# Patient Record
Sex: MALE | Race: BLACK OR AFRICAN AMERICAN | NOT HISPANIC OR LATINO | URBAN - METROPOLITAN AREA
[De-identification: names, ages, dates, MRNs, and addresses within clinical notes are randomized per-mention and may not be internally consistent; named-entity substitution may affect disease eponyms.]

---

## 2021-10-16 ENCOUNTER — EMERGENCY (EMERGENCY)
Facility: HOSPITAL | Age: 26
LOS: 1 days | Discharge: HOME | End: 2021-10-16
Attending: EMERGENCY MEDICINE | Admitting: EMERGENCY MEDICINE
Payer: MEDICAID

## 2021-10-16 VITALS
HEART RATE: 78 BPM | DIASTOLIC BLOOD PRESSURE: 58 MMHG | OXYGEN SATURATION: 96 % | TEMPERATURE: 98 F | SYSTOLIC BLOOD PRESSURE: 116 MMHG | RESPIRATION RATE: 12 BRPM

## 2021-10-16 PROCEDURE — 99283 EMERGENCY DEPT VISIT LOW MDM: CPT | Mod: 25

## 2021-10-16 PROCEDURE — 12001 RPR S/N/AX/GEN/TRNK 2.5CM/<: CPT

## 2021-10-16 RX ORDER — TETANUS TOXOID, REDUCED DIPHTHERIA TOXOID AND ACELLULAR PERTUSSIS VACCINE, ADSORBED 5; 2.5; 8; 8; 2.5 [IU]/.5ML; [IU]/.5ML; UG/.5ML; UG/.5ML; UG/.5ML
0.5 SUSPENSION INTRAMUSCULAR ONCE
Refills: 0 | Status: COMPLETED | OUTPATIENT
Start: 2021-10-16 | End: 2021-10-16

## 2021-10-16 RX ORDER — IBUPROFEN 200 MG
400 TABLET ORAL ONCE
Refills: 0 | Status: COMPLETED | OUTPATIENT
Start: 2021-10-16 | End: 2021-10-16

## 2021-10-16 RX ORDER — ACETAMINOPHEN 500 MG
650 TABLET ORAL ONCE
Refills: 0 | Status: COMPLETED | OUTPATIENT
Start: 2021-10-16 | End: 2021-10-16

## 2021-10-16 RX ADMIN — Medication 650 MILLIGRAM(S): at 14:48

## 2021-10-16 RX ADMIN — Medication 400 MILLIGRAM(S): at 14:43

## 2021-10-16 RX ADMIN — TETANUS TOXOID, REDUCED DIPHTHERIA TOXOID AND ACELLULAR PERTUSSIS VACCINE, ADSORBED 0.5 MILLILITER(S): 5; 2.5; 8; 8; 2.5 SUSPENSION INTRAMUSCULAR at 14:43

## 2021-10-16 NOTE — ED PROVIDER NOTE - NSFOLLOWUPINSTRUCTIONS_ED_ALL_ED_FT
Finger Amputation    WHAT YOU NEED TO KNOW:    Finger amputation is when part of a finger is removed from your hand.    DISCHARGE INSTRUCTIONS:    Follow up with your bone or hand specialist within 2 days: Write down any questions you have so you remember to ask them in your follow-up visits.    How to care for your injury: Follow your treatment plan to help prevent an infection or further tissue loss.    Wound care: If you do not see a bone or hand specialist within 2 days, you will need to change your bandage and clean your wound as directed.    Splints: You may need to wear a splint to support your finger while it heals. Wear the splint as directed.  Medicines:    Pain medicine: You may be given pain medicine to take away or decrease pain. Do not wait until the pain is severe before you take your medicine.    Antibiotics: This medicine will help fight or prevent an infection. Take your antibiotics until they are gone, even if you feel better.    Take your medicine as directed: Call your bone or hand specialist if you think your medicine is not helping or if you have side effects. Tell him if you are taking any vitamins, herbs, or other medicines. Keep a list of the medicines you take. Include the medicine given to you today. Bring the list or the pill bottles to follow-up visits.  Return to the emergency department if:    Your wound drains pus (thick white or yellow fluid).    Your wound starts to bleed and does not stop even after you apply pressure.    Your wound bleeds more than usual.    Your pain is severe even after you take your pain medicine.    You have a fever. Finger Amputation  WHAT YOU NEED TO KNOW:    Finger amputation is when part of a finger is removed from your hand.    DISCHARGE INSTRUCTIONS:    Follow up with your bone or hand specialist within 2 days: Write down any questions you have so you remember to ask them in your follow-up visits.    How to care for your injury: Follow your treatment plan to help prevent an infection or further tissue loss.    Wound care: If you do not see a bone or hand specialist within 2 days, you will need to change your bandage and clean your wound as directed.    Splints: You may need to wear a splint to support your finger while it heals. Wear the splint as directed.  Medicines:    Pain medicine: You may be given pain medicine to take away or decrease pain. Do not wait until the pain is severe before you take your medicine.    Antibiotics: This medicine will help fight or prevent an infection. Take your antibiotics until they are gone, even if you feel better.    Take your medicine as directed: Call your bone or hand specialist if you think your medicine is not helping or if you have side effects. Tell him if you are taking any vitamins, herbs, or other medicines. Keep a list of the medicines you take. Include the medicine given to you today. Bring the list or the pill bottles to follow-up visits.  Return to the emergency department if:    Your wound drains pus (thick white or yellow fluid).    Your wound starts to bleed and does not stop even after you apply pressure.    Your wound bleeds more than usual.    Your pain is severe even after you take your pain medicine.    You have a fever.

## 2021-10-16 NOTE — ED PROVIDER NOTE - ATTENDING CONTRIBUTION TO CARE
pt c/o of worsening pain s/p right pointer finger laceration last night, bleeding controlled, skin pink / warm., able to move. pain 10/10 went trying to sleep, some relief with Motrin this am.  The patient is a 26y Male who has denies any past medical and surgery history of PTED c/o of worsening pain in his right pointer finger >24 hours after a  laceration he sustained last night. Patient attempted relief with some motrin this am with success.   Vital Signs Last 24 Hrs  T(F): 98.2 HR: 78 BP: 116/58 RR: 12 SpO2: 96% (16 Oct 2021 13:35) (96% - 96%)  PE: as described; my additions and exceptions are noted in the chart    IMPRESSION/RISK:  Dx=fingertip injury with non viable flap     Consideration include: flap non viable by supply and prolonged interval since eval; ? if occult fx or tip avulsion  Plan  acetaminophen   Tablet .. 650 milliGRAM(s) Oral  diphtheria/tetanus/pertussis (acellular) Vaccine (ADAcel) 0.5 milliLiter(s) IntraMuscular  ibuprofen  Tablet. 400 milliGRAM(s) Oral  Xray  reassess  d/c with hand followup  RTED PRN I have seen and examined the patient on the patient´s visit date. I have reviewed the note written by the resident Jameel Banegas MD on that visit day. I have supervised and participated as necessary in the performance of procedures indicated for patient management and was available at all phases of the patient´s visit when needed. We discussed the history, physical exam findings, management plan, and  medical decision making. I have made my additions, exceptions, and revisions within the chart and I agree with H and P as documented in its entirety. The data and my interpretation of any data collected from labs, interventions and imaging appear below as well as my independent medical decision making and considerations    The patient is a 26y Male who has denies any past medical and surgery history of PTED c/o of worsening pain in his right pointer finger >24 hours after a  laceration he sustained last night. Patient attempted relief with some motrin this am with success.   Vital Signs Last 24 Hrs  T(F): 98.2 HR: 78 BP: 116/58 RR: 12 SpO2: 96% (16 Oct 2021 13:35) (96% - 96%)  PE: as described; my additions and exceptions are noted in the chart    IMPRESSION/RISK:  Dx=fingertip injury with non viable flap     Consideration include: flap non viable by supply and prolonged interval since eval; ? if occult fx or tip avulsion  Plan  acetaminophen   Tablet .. 650 milliGRAM(s) Oral  diphtheria/tetanus/pertussis (acellular) Vaccine (ADAcel) 0.5 milliLiter(s) IntraMuscular  ibuprofen  Tablet. 400 milliGRAM(s) Oral  Xray  reassess  d/c with hand followup  RTED PRN

## 2021-10-16 NOTE — ED PROVIDER NOTE - CLINICAL SUMMARY MEDICAL DECISION MAKING FREE TEXT BOX
27 y/o M with no pertinent PMH p/w simple laceration to the distal right finger involving subcutaneous tissue, no bone. Tissue does not appear viable or amendable to laceration repair. Will consider using skin as bio bandage. No other injuries. Will update tetanus and d/c with PCP follow up.

## 2021-10-16 NOTE — ED PROVIDER NOTE - PATIENT PORTAL LINK FT
You can access the FollowMyHealth Patient Portal offered by [facility] by registering at the following website: [email or web address]. By joining [employer]’s FollowMyHealth portal, you will also be able to view your health information using other applications (apps) compatible with our system.

## 2021-10-16 NOTE — ED PROVIDER NOTE - OBJECTIVE STATEMENT
27 y/o M with no pertinent PMHx presents to the ED c/o laceration to R index finger sustained last night. Pt reports cutting his finger on a sharp surface. Bleeding was resolved with pressure and skin flap was placed over wound. Hydrogen peroxide was poured on wound. Pt reports increasing pain at that time, now more severe in intensity and of sharp quality. Advil was taken with moderate improvement. No other obvious lacerations present. Unknown history of tetanus vaccination. Denies any other injuries.

## 2021-10-16 NOTE — ED ADULT TRIAGE NOTE - CHIEF COMPLAINT QUOTE
pt c/o of worsening pain s/p right pointer finger laceration last night, bleeding controlled, skin pink / warm., able to move. pain 10/10 went trying to sleep, some relief with Motrin this am.

## 2021-10-16 NOTE — ED PROVIDER NOTE - SKIN WOUND DESCRIPTION
+1CM laceration to distal tip of R second digit. No bony involvement. Base of wound is clearly visualized. No foreign bodies in bloodless field.

## 2021-11-26 ENCOUNTER — OUTPATIENT (OUTPATIENT)
Dept: OUTPATIENT SERVICES | Facility: HOSPITAL | Age: 26
LOS: 1 days | End: 2021-11-26

## 2021-11-26 DIAGNOSIS — Z20.822 CONTACT WITH AND (SUSPECTED) EXPOSURE TO COVID-19: ICD-10-CM

## 2021-11-27 LAB — SARS-COV-2 RNA SPEC QL NAA+PROBE: SIGNIFICANT CHANGE UP

## 2022-03-24 ENCOUNTER — EMERGENCY (EMERGENCY)
Facility: HOSPITAL | Age: 27
LOS: 1 days | Discharge: HOME | End: 2022-03-24
Attending: EMERGENCY MEDICINE | Admitting: EMERGENCY MEDICINE
Payer: MEDICAID

## 2022-03-24 VITALS
HEART RATE: 81 BPM | TEMPERATURE: 98 F | OXYGEN SATURATION: 100 % | DIASTOLIC BLOOD PRESSURE: 66 MMHG | RESPIRATION RATE: 19 BRPM | SYSTOLIC BLOOD PRESSURE: 131 MMHG

## 2022-03-24 PROCEDURE — 99285 EMERGENCY DEPT VISIT HI MDM: CPT

## 2022-03-24 NOTE — ED ADULT NURSE NOTE - OBJECTIVE STATEMENT
Break RN- Pt received to rm 18, awake and alert, A&OX4, ambulatory. C/o abdominal distension. States he has vomited 8x today, with one episode of blood in emesis. Reports "abdominal fullness" every morning when he wakes up x3 months. Reporting regular BM this morning.  C/o mild epigastric discomfort. Respirations even and unlabored. Resting comfortably. Denies CP, SOB, N/V, HA, dizziness, palpitations, fatigue, urinary symptoms. Awaiting plan. Bed in lowest position, call bell within reach. Will continue to monitor. Break RN- Pt received to rm 18, awake and alert, A&OX4, ambulatory. C/o abdominal distension. States he has vomited 8x today, with one episode of blood in emesis. Reports "abdominal fullness" every morning when he wakes up x3 months. Reporting regular BM this morning.  C/o mild epigastric discomfort. Respirations even and unlabored. Resting comfortably. Denies pain to abdomen. Denies CP, SOB, N/V, HA, dizziness, palpitations, fatigue, urinary symptoms. Awaiting plan. Bed in lowest position, call bell within reach. Will continue to monitor.

## 2022-03-24 NOTE — ED ADULT TRIAGE NOTE - CHIEF COMPLAINT QUOTE
No BM in 4 days, abd distention - 1 episode of nausea & vomiting after drinking a mag citrate - no pmh, no psh  - well appearing in triage

## 2022-03-25 VITALS
OXYGEN SATURATION: 100 % | RESPIRATION RATE: 17 BRPM | TEMPERATURE: 98 F | HEART RATE: 90 BPM | DIASTOLIC BLOOD PRESSURE: 64 MMHG | SYSTOLIC BLOOD PRESSURE: 109 MMHG

## 2022-03-25 LAB
ALBUMIN SERPL ELPH-MCNC: 4.5 G/DL — SIGNIFICANT CHANGE UP (ref 3.3–5)
ALP SERPL-CCNC: 48 U/L — SIGNIFICANT CHANGE UP (ref 40–120)
ALT FLD-CCNC: 19 U/L — SIGNIFICANT CHANGE UP (ref 4–41)
ANION GAP SERPL CALC-SCNC: 12 MMOL/L — SIGNIFICANT CHANGE UP (ref 7–14)
AST SERPL-CCNC: 21 U/L — SIGNIFICANT CHANGE UP (ref 4–40)
BASOPHILS # BLD AUTO: 0.03 K/UL — SIGNIFICANT CHANGE UP (ref 0–0.2)
BASOPHILS NFR BLD AUTO: 0.5 % — SIGNIFICANT CHANGE UP (ref 0–2)
BILIRUB SERPL-MCNC: 0.2 MG/DL — SIGNIFICANT CHANGE UP (ref 0.2–1.2)
BUN SERPL-MCNC: 17 MG/DL — SIGNIFICANT CHANGE UP (ref 7–23)
CALCIUM SERPL-MCNC: 9.7 MG/DL — SIGNIFICANT CHANGE UP (ref 8.4–10.5)
CHLORIDE SERPL-SCNC: 103 MMOL/L — SIGNIFICANT CHANGE UP (ref 98–107)
CO2 SERPL-SCNC: 26 MMOL/L — SIGNIFICANT CHANGE UP (ref 22–31)
CREAT SERPL-MCNC: 1.04 MG/DL — SIGNIFICANT CHANGE UP (ref 0.5–1.3)
EGFR: 101 ML/MIN/1.73M2 — SIGNIFICANT CHANGE UP
EOSINOPHIL # BLD AUTO: 0.36 K/UL — SIGNIFICANT CHANGE UP (ref 0–0.5)
EOSINOPHIL NFR BLD AUTO: 6.4 % — HIGH (ref 0–6)
GLUCOSE SERPL-MCNC: 93 MG/DL — SIGNIFICANT CHANGE UP (ref 70–99)
HCT VFR BLD CALC: 41.4 % — SIGNIFICANT CHANGE UP (ref 39–50)
HGB BLD-MCNC: 14 G/DL — SIGNIFICANT CHANGE UP (ref 13–17)
IANC: 2.53 K/UL — SIGNIFICANT CHANGE UP (ref 1.8–7.4)
IMM GRANULOCYTES NFR BLD AUTO: 0.7 % — SIGNIFICANT CHANGE UP (ref 0–1.5)
LIDOCAIN IGE QN: 26 U/L — SIGNIFICANT CHANGE UP (ref 7–60)
LYMPHOCYTES # BLD AUTO: 2.12 K/UL — SIGNIFICANT CHANGE UP (ref 1–3.3)
LYMPHOCYTES # BLD AUTO: 37.7 % — SIGNIFICANT CHANGE UP (ref 13–44)
MCHC RBC-ENTMCNC: 28.3 PG — SIGNIFICANT CHANGE UP (ref 27–34)
MCHC RBC-ENTMCNC: 33.8 GM/DL — SIGNIFICANT CHANGE UP (ref 32–36)
MCV RBC AUTO: 83.6 FL — SIGNIFICANT CHANGE UP (ref 80–100)
MONOCYTES # BLD AUTO: 0.55 K/UL — SIGNIFICANT CHANGE UP (ref 0–0.9)
MONOCYTES NFR BLD AUTO: 9.8 % — SIGNIFICANT CHANGE UP (ref 2–14)
NEUTROPHILS # BLD AUTO: 2.53 K/UL — SIGNIFICANT CHANGE UP (ref 1.8–7.4)
NEUTROPHILS NFR BLD AUTO: 44.9 % — SIGNIFICANT CHANGE UP (ref 43–77)
NRBC # BLD: 0 /100 WBCS — SIGNIFICANT CHANGE UP
NRBC # FLD: 0 K/UL — SIGNIFICANT CHANGE UP
PLATELET # BLD AUTO: 243 K/UL — SIGNIFICANT CHANGE UP (ref 150–400)
POTASSIUM SERPL-MCNC: 4.3 MMOL/L — SIGNIFICANT CHANGE UP (ref 3.5–5.3)
POTASSIUM SERPL-SCNC: 4.3 MMOL/L — SIGNIFICANT CHANGE UP (ref 3.5–5.3)
PROT SERPL-MCNC: 7 G/DL — SIGNIFICANT CHANGE UP (ref 6–8.3)
RBC # BLD: 4.95 M/UL — SIGNIFICANT CHANGE UP (ref 4.2–5.8)
RBC # FLD: 12.5 % — SIGNIFICANT CHANGE UP (ref 10.3–14.5)
SODIUM SERPL-SCNC: 141 MMOL/L — SIGNIFICANT CHANGE UP (ref 135–145)
WBC # BLD: 5.63 K/UL — SIGNIFICANT CHANGE UP (ref 3.8–10.5)
WBC # FLD AUTO: 5.63 K/UL — SIGNIFICANT CHANGE UP (ref 3.8–10.5)

## 2022-03-25 PROCEDURE — 74177 CT ABD & PELVIS W/CONTRAST: CPT | Mod: 26,MA

## 2022-03-25 RX ORDER — MULTIVIT WITH MIN/MFOLATE/K2 340-15/3 G
1 POWDER (GRAM) ORAL ONCE
Refills: 0 | Status: COMPLETED | OUTPATIENT
Start: 2022-03-25 | End: 2022-03-25

## 2022-03-25 RX ORDER — KETOROLAC TROMETHAMINE 30 MG/ML
15 SYRINGE (ML) INJECTION ONCE
Refills: 0 | Status: DISCONTINUED | OUTPATIENT
Start: 2022-03-25 | End: 2022-03-25

## 2022-03-25 RX ORDER — ONDANSETRON 8 MG/1
4 TABLET, FILM COATED ORAL ONCE
Refills: 0 | Status: COMPLETED | OUTPATIENT
Start: 2022-03-25 | End: 2022-03-25

## 2022-03-25 RX ORDER — SODIUM CHLORIDE 9 MG/ML
1000 INJECTION, SOLUTION INTRAVENOUS ONCE
Refills: 0 | Status: COMPLETED | OUTPATIENT
Start: 2022-03-25 | End: 2022-03-25

## 2022-03-25 RX ADMIN — Medication 15 MILLIGRAM(S): at 00:44

## 2022-03-25 RX ADMIN — SODIUM CHLORIDE 1000 MILLILITER(S): 9 INJECTION, SOLUTION INTRAVENOUS at 00:44

## 2022-03-25 RX ADMIN — ONDANSETRON 4 MILLIGRAM(S): 8 TABLET, FILM COATED ORAL at 00:45

## 2022-03-25 RX ADMIN — Medication 1 BOTTLE: at 04:13

## 2022-03-25 NOTE — ED PROVIDER NOTE - PATIENT PORTAL LINK FT
You can access the FollowMyHealth Patient Portal offered by [facility] by registering at the following website: [email or web address]. By joining [employer]’s FollowMyHealth portal, you will also be able to view your health information using other applications (apps) compatible with our system.

## 2022-03-25 NOTE — ED PROVIDER NOTE - CLINICAL SUMMARY MEDICAL DECISION MAKING FREE TEXT BOX
Marisa - pt is a 26 yo M who presents with abd distension and constipation. Likely symptoms 2/2 constipation. Will check cbc, cmp, lipase [place] - pt is a 26 yo M who presents with abd distension and constipation. Likely symptoms 2/2 constipation. Will check cbc, cmp, lipase, xray abd

## 2022-03-25 NOTE — ED PROVIDER NOTE - NSFOLLOWUPINSTRUCTIONS_ED_ALL_ED_FT
You were seen in the ED for constipation.    Your blood work and CT abdomen were reassuring and did not show any acute pathology.    You got an enema and magnesium citrate here in the ED.    At home, please take Miralax and senna as per the over the counter package instructions to help you have bowel movement.    Stay hydrated and eat a diet high in fiber.    Please follow up with your primary care doctor in 2-3 days. Return to the ED if you experience any worsening or new symptoms or any symptoms that concern you, including fevers, chills, shortness of breath, chest pain, worsening constipation, vomiting.        Constipation, Adult      Constipation is when a person has fewer than three bowel movements in a week, has difficulty having a bowel movement, or has stools (feces) that are dry, hard, or larger than normal. Constipation may be caused by an underlying condition. It may become worse with age if a person takes certain medicines and does not take in enough fluids.      Follow these instructions at home:      Eating and drinking      •Eat foods that have a lot of fiber, such as beans, whole grains, and fresh fruits and vegetables.      •Limit foods that are low in fiber and high in fat and processed sugars, such as fried or sweet foods. These include french fries, hamburgers, cookies, candies, and soda.      •Drink enough fluid to keep your urine pale yellow.      General instructions     •Exercise regularly or as told by your health care provider. Try to do 150 minutes of moderate exercise each week.      •Use the bathroom when you have the urge to go. Do not hold it in.      •Take over-the-counter and prescription medicines only as told by your health care provider. This includes any fiber supplements.    •During bowel movements:   •Practice deep breathing while relaxing the lower abdomen.      •Practice pelvic floor relaxation.        •Watch your condition for any changes. Let your health care provider know about them.      •Keep all follow-up visits as told by your health care provider. This is important.        Contact a health care provider if:    •You have pain that gets worse.      •You have a fever.      •You do not have a bowel movement after 4 days.      •You vomit.      •You are not hungry or you lose weight.      •You are bleeding from the opening between the buttocks (anus).      •You have thin, pencil-like stools.        Get help right away if:    •You have a fever and your symptoms suddenly get worse.      •You leak stool or have blood in your stool.      •Your abdomen is bloated.      •You have severe pain in your abdomen.      •You feel dizzy or you faint.        Summary    •Constipation is when a person has fewer than three bowel movements in a week, has difficulty having a bowel movement, or has stools (feces) that are dry, hard, or larger than normal.      •Eat foods that have a lot of fiber, such as beans, whole grains, and fresh fruits and vegetables.      •Drink enough fluid to keep your urine pale yellow.      •Take over-the-counter and prescription medicines only as told by your health care provider. This includes any fiber supplements.

## 2022-03-25 NOTE — ED PROVIDER NOTE - ATTENDING CONTRIBUTION TO CARE
I performed a face-to-face evaluation of the patient and performed a history and physical examination along with the resident or ACP, and/or medical student above.  I agree with the history and physical examination as documented by the resident or ACP, and/or medical student above.  De Los Santos:  28yo M, w/ pmh of external and internal hemorrhoids, s/p colonoscopy few months ago as outpt (unsure if any other findings), p/w constipation (no BM for 4 days until passage of few "small pellets today"), a/w abd distention/discomfort and few episode of hematemesis vs blood-tinged emesis after drinking mag citrate (pt unable to quantify). Denies etoh consumption. Denies hx of varices. Denies f/c/dark or bloody stools/recent nsaid use. No known significant family hx of cancer. Labs, imaging, meds, reassess.

## 2022-03-25 NOTE — ED PROVIDER NOTE - PROGRESS NOTE DETAILS
CT shows no acute pathology. large stool burden. will give enema and mag citrate Patient was re-evaluated and doing well. Results, including any incidental findings, were discussed. Return precautions and follow up were discussed. Patient verbalized understanding.

## 2022-03-25 NOTE — ED PROVIDER NOTE - OBJECTIVE STATEMENT
pt is a 28 yo M who presents with abd distension and constipation. Pt has not had a bowel movement in 4 days. Today he was able to pass a few small pellets. When he tried to take mag citrate he vomited it up at 6pm. Has been feeling nausea all day. Decreased PO intake. Denies fevers. No abd surgery in past. Mild cramping abd pain. states he stays hydrated

## 2022-03-25 NOTE — ED PROVIDER NOTE - PHYSICAL EXAMINATION
Grisel Cunningham MD  GENERAL: Patient awake alert NAD.  HEENT: NC/AT, Moist mucous membranes, EOMI.  LUNGS: CTAB, no wheezes or crackles.   CARDIAC: RRR, no m/r/g.    ABDOMEN: Soft, +mildly tender RLQ and LLQ, mild distension, No rebound, guarding. No CVA tenderness.   EXT: No edema.   MSK: no pain with movement, no deformities.  NEURO: A&Ox3. Moving all extremities.  SKIN: Warm and dry. No rash.  PSYCH: Normal affect. Grisel Cunningham MD  GENERAL: Patient awake alert NAD.  HEENT: NC/AT, Moist mucous membranes, EOMI.  LUNGS: CTAB, no wheezes or crackles.   CARDIAC: RRR, no m/r/g.    ABDOMEN: Soft, +mildly tender RLQ and LLQ, mild distension, No rebound, guarding. No CVA tenderness.   Rectal: no hemorrhoids seen or palpated. no stool in the rectal vault. no blood  EXT: No edema.   MSK: no pain with movement, no deformities.  NEURO: A&Ox3. Moving all extremities.  SKIN: Warm and dry. No rash.  PSYCH: Normal affect.

## 2022-12-10 ENCOUNTER — EMERGENCY (EMERGENCY)
Facility: HOSPITAL | Age: 27
LOS: 1 days | Discharge: HOME | End: 2022-12-10
Attending: EMERGENCY MEDICINE | Admitting: EMERGENCY MEDICINE

## 2022-12-10 VITALS
HEART RATE: 95 BPM | RESPIRATION RATE: 20 BRPM | DIASTOLIC BLOOD PRESSURE: 55 MMHG | OXYGEN SATURATION: 100 % | TEMPERATURE: 101 F | SYSTOLIC BLOOD PRESSURE: 111 MMHG

## 2022-12-10 VITALS
TEMPERATURE: 99 F | RESPIRATION RATE: 18 BRPM | HEART RATE: 80 BPM | OXYGEN SATURATION: 100 % | SYSTOLIC BLOOD PRESSURE: 111 MMHG | DIASTOLIC BLOOD PRESSURE: 60 MMHG

## 2022-12-10 PROBLEM — Z78.9 OTHER SPECIFIED HEALTH STATUS: Chronic | Status: ACTIVE | Noted: 2022-03-25

## 2022-12-10 LAB
ALBUMIN SERPL ELPH-MCNC: 4.4 G/DL — SIGNIFICANT CHANGE UP (ref 3.3–5)
ALP SERPL-CCNC: 54 U/L — SIGNIFICANT CHANGE UP (ref 40–120)
ALT FLD-CCNC: 9 U/L — SIGNIFICANT CHANGE UP (ref 4–41)
ANION GAP SERPL CALC-SCNC: 10 MMOL/L — SIGNIFICANT CHANGE UP (ref 7–14)
AST SERPL-CCNC: 18 U/L — SIGNIFICANT CHANGE UP (ref 4–40)
BILIRUB SERPL-MCNC: <0.2 MG/DL — SIGNIFICANT CHANGE UP (ref 0.2–1.2)
BUN SERPL-MCNC: 13 MG/DL — SIGNIFICANT CHANGE UP (ref 7–23)
CALCIUM SERPL-MCNC: 9.5 MG/DL — SIGNIFICANT CHANGE UP (ref 8.4–10.5)
CHLORIDE SERPL-SCNC: 102 MMOL/L — SIGNIFICANT CHANGE UP (ref 98–107)
CO2 SERPL-SCNC: 27 MMOL/L — SIGNIFICANT CHANGE UP (ref 22–31)
CREAT SERPL-MCNC: 0.97 MG/DL — SIGNIFICANT CHANGE UP (ref 0.5–1.3)
EGFR: 110 ML/MIN/1.73M2 — SIGNIFICANT CHANGE UP
FLUAV AG NPH QL: DETECTED
FLUBV AG NPH QL: SIGNIFICANT CHANGE UP
GLUCOSE SERPL-MCNC: 101 MG/DL — HIGH (ref 70–99)
HCT VFR BLD CALC: 41.9 % — SIGNIFICANT CHANGE UP (ref 39–50)
HGB BLD-MCNC: 13.7 G/DL — SIGNIFICANT CHANGE UP (ref 13–17)
MCHC RBC-ENTMCNC: 27.2 PG — SIGNIFICANT CHANGE UP (ref 27–34)
MCHC RBC-ENTMCNC: 32.7 GM/DL — SIGNIFICANT CHANGE UP (ref 32–36)
MCV RBC AUTO: 83.1 FL — SIGNIFICANT CHANGE UP (ref 80–100)
NRBC # BLD: 0 /100 WBCS — SIGNIFICANT CHANGE UP (ref 0–0)
NRBC # FLD: 0 K/UL — SIGNIFICANT CHANGE UP (ref 0–0)
PLATELET # BLD AUTO: 246 K/UL — SIGNIFICANT CHANGE UP (ref 150–400)
POTASSIUM SERPL-MCNC: 4.3 MMOL/L — SIGNIFICANT CHANGE UP (ref 3.5–5.3)
POTASSIUM SERPL-SCNC: 4.3 MMOL/L — SIGNIFICANT CHANGE UP (ref 3.5–5.3)
PROT SERPL-MCNC: 7.2 G/DL — SIGNIFICANT CHANGE UP (ref 6–8.3)
RBC # BLD: 5.04 M/UL — SIGNIFICANT CHANGE UP (ref 4.2–5.8)
RBC # FLD: 12.6 % — SIGNIFICANT CHANGE UP (ref 10.3–14.5)
RSV RNA NPH QL NAA+NON-PROBE: SIGNIFICANT CHANGE UP
SARS-COV-2 RNA SPEC QL NAA+PROBE: SIGNIFICANT CHANGE UP
SODIUM SERPL-SCNC: 139 MMOL/L — SIGNIFICANT CHANGE UP (ref 135–145)
WBC # BLD: 6.61 K/UL — SIGNIFICANT CHANGE UP (ref 3.8–10.5)
WBC # FLD AUTO: 6.61 K/UL — SIGNIFICANT CHANGE UP (ref 3.8–10.5)

## 2022-12-10 PROCEDURE — 99284 EMERGENCY DEPT VISIT MOD MDM: CPT

## 2022-12-10 RX ORDER — KETOROLAC TROMETHAMINE 30 MG/ML
15 SYRINGE (ML) INJECTION ONCE
Refills: 0 | Status: DISCONTINUED | OUTPATIENT
Start: 2022-12-10 | End: 2022-12-10

## 2022-12-10 RX ORDER — SODIUM CHLORIDE 9 MG/ML
1000 INJECTION, SOLUTION INTRAVENOUS ONCE
Refills: 0 | Status: COMPLETED | OUTPATIENT
Start: 2022-12-10 | End: 2022-12-10

## 2022-12-10 RX ADMIN — SODIUM CHLORIDE 1000 MILLILITER(S): 9 INJECTION, SOLUTION INTRAVENOUS at 22:14

## 2022-12-10 RX ADMIN — Medication 15 MILLIGRAM(S): at 22:13

## 2022-12-10 NOTE — ED PROVIDER NOTE - CLINICAL SUMMARY MEDICAL DECISION MAKING FREE TEXT BOX
27-year-old male with flulike illness, no respiratory distress.,  Decreased p.o. intake.  Will give some hydration check CBC and CMP, Toradol for fever and muscle aches and reassess

## 2022-12-10 NOTE — ED PROVIDER NOTE - OBJECTIVE STATEMENT
27-year-old male complaining of 4 days of sore throat runny nose cough some mild chest discomfort on deep inspiration or coughing, muscle aches decreased p.o. intake and feeling cold all the time.  Son is sick with the same syndrome. Patient is not COVID nor influenza vaccinated.  Patient patient denies past medical history does not smoke drink or use any drugs.  Not short of breath.  No vomiting or diarrhea.  Took Tylenol Cold yesterday with some relief and took Tylenol this morning as well.

## 2022-12-10 NOTE — ED PROVIDER NOTE - PATIENT PORTAL LINK FT
You can access the FollowMyHealth Patient Portal offered by [facility] by registering at the following website: [email or web address]. By joining [employer]’s FollowMyHealth portal, you will also be able to view your health information using other applications (apps) compatible with our system.

## 2022-12-10 NOTE — ED PROVIDER NOTE - PHYSICAL EXAMINATION
Well-appearing no acute distress mild nasal congestion   throat with mild erythema no exudate no tonsillar hypertrophy   neck supple no adenopathy   heart S1-S2   no murmurs gallops or rubs   lungs clear   abdomen soft nontender no hepatosplenomegaly   extremities no edema  motor 5/5 sensation intact   skin no rashes

## 2022-12-10 NOTE — ED PROVIDER NOTE - NSFOLLOWUPINSTRUCTIONS_ED_ALL_ED_FT
you were seen in the emergency room for a viral syndrome   drink plenty of fluids,, Tylenol 650 mg every 6 hours as needed for fever or muscle aches or ibuprofen 600 mg every 6 hours.    Mucinex 1 every 8 hours as needed for cough   May take over-the-counter decongestant for nasal congestion     return to the emergency room for difficulty breathing inability to eat or drink and stay hydrated with new or concerning symptoms    Advance activity as tolerated.  Continue all previously prescribed medications as directed unless otherwise instructed.  Follow up with your primary care physician in 48-72 hours- bring copies of your results.  Return to the ER for worsening or persistent symptoms, and/or ANY NEW OR CONCERNING SYMPTOMS. If you have issues obtaining follow up, please call: [phone] to obtain a doctor or specialist who takes your insurance in your area.  You may call [phone] to make an appointment with the internal medicine clinic. You were seen in the emergency room for a viral syndrome   drink plenty of fluids,, Tylenol 650 mg every 6 hours as needed for fever or muscle aches or ibuprofen 600 mg every 6 hours.    Mucinex 1 every 8 hours as needed for cough   May take over-the-counter decongestant for nasal congestion     return to the emergency room for difficulty breathing inability to eat or drink and stay hydrated with new or concerning symptoms    Advance activity as tolerated.  Continue all previously prescribed medications as directed unless otherwise instructed.  Follow up with your primary care physician in 48-72 hours- bring copies of your results.  Return to the ER for worsening or persistent symptoms, and/or ANY NEW OR CONCERNING SYMPTOMS. If you have issues obtaining follow up, please call: [phone] to obtain a doctor or specialist who takes your insurance in your area.  You may call [phone] to make an appointment with the internal medicine clinic.

## 2022-12-10 NOTE — ED ADULT NURSE NOTE - OBJECTIVE STATEMENT
received pt in intake room 9, 27 yr/o male A+Ox4m ambulatory at baseline. pt presented to the ED C/O flu like symptoms and sore throat for 2 days. congestion and dry cough noted. right Ac 20g placed, labs drawn and sent. meds given as ordered. will continue to monitor

## 2023-10-13 ENCOUNTER — EMERGENCY (EMERGENCY)
Facility: HOSPITAL | Age: 28
LOS: 0 days | Discharge: HOME | End: 2023-10-14
Attending: STUDENT IN AN ORGANIZED HEALTH CARE EDUCATION/TRAINING PROGRAM
Payer: COMMERCIAL

## 2023-10-13 VITALS
HEIGHT: 64 IN | RESPIRATION RATE: 18 BRPM | WEIGHT: 171.96 LBS | OXYGEN SATURATION: 98 % | DIASTOLIC BLOOD PRESSURE: 73 MMHG | HEART RATE: 87 BPM | SYSTOLIC BLOOD PRESSURE: 123 MMHG | TEMPERATURE: 99 F

## 2023-10-13 DIAGNOSIS — M54.2 CERVICALGIA: ICD-10-CM
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
## 2023-10-13 DIAGNOSIS — M54.9 DORSALGIA, UNSPECIFIED: ICD-10-CM
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
## 2023-10-13 PROCEDURE — 99284 EMERGENCY DEPT VISIT MOD MDM: CPT

## 2023-10-13 RX ORDER — LIDOCAINE 4 G/100G
1 CREAM TOPICAL ONCE
Refills: 0 | Status: COMPLETED | OUTPATIENT
Start: 2023-10-13 | End: 2023-10-13

## 2023-10-13 RX ORDER — KETOROLAC TROMETHAMINE 30 MG/ML
15 SYRINGE (ML) INJECTION ONCE
Refills: 0 | Status: DISCONTINUED | OUTPATIENT
Start: 2023-10-13 | End: 2023-10-13

## 2023-10-13 RX ADMIN — Medication 15 MILLIGRAM(S): at 22:50

## 2023-10-13 RX ADMIN — LIDOCAINE 1 PATCH: 4 CREAM TOPICAL at 22:50
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
## 2023-10-14 VITALS — TEMPERATURE: 98 F | DIASTOLIC BLOOD PRESSURE: 87 MMHG | SYSTOLIC BLOOD PRESSURE: 112 MMHG | HEART RATE: 88 BPM

## 2023-10-14 RX ORDER — IBUPROFEN 200 MG
1 TABLET ORAL
Qty: 20 | Refills: 0
Start: 2023-10-14 | End: 2023-10-18

## 2023-10-14 RX ORDER — LIDOCAINE 4 G/100G
1 CREAM TOPICAL
Qty: 2 | Refills: 0
Start: 2023-10-14 | End: 2023-10-18

## 2023-10-14 NOTE — ED PROVIDER NOTE - PHYSICAL EXAMINATION
General: Well appearing male in no acute distress  HEENT: Normocephalic, atraumatic. Moist mucous membranes. Oropharynx clear. No lymphadenopathy.  Eyes: No scleral icterus. EOMI. KANWAL.  Neck:. Soft and supple. Full ROM without pain. No midline tenderness. R paraspinal cervical tenderness  Cardiac: Regular rate and regular rhythm. No murmurs, rubs, gallops. Peripheral pulses 2+ and symmetric. No LE edema.  Resp: Lungs CTAB. Speaking in full sentences. No wheezes, rales or rhonchi.  Abd: Soft, non-tender, non-distended. No guarding or rebound. No scars, masses, or lesions.  Back: Spine midline and non-tender. No CVA tenderness.    Skin: No rashes, abrasions, or lacerations.  Neuro: AO x 3. Moves all extremities symmetrically. Motor strength and sensation grossly intact.

## 2023-10-14 NOTE — ED PROVIDER NOTE - PATIENT PORTAL LINK FT
You can access the FollowMyHealth Patient Portal offered by [facility] by registering at the following website: [email or web address]. By joining [employer]’s FollowMyHealth portal, you will also be able to view your health information using other applications (apps) compatible with our system.
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
